# Patient Record
Sex: MALE | Race: WHITE | Employment: FULL TIME | URBAN - METROPOLITAN AREA
[De-identification: names, ages, dates, MRNs, and addresses within clinical notes are randomized per-mention and may not be internally consistent; named-entity substitution may affect disease eponyms.]

---

## 2017-10-18 ENCOUNTER — HOSPITAL ENCOUNTER (OUTPATIENT)
Dept: PHYSICAL THERAPY | Age: 32
Discharge: HOME | End: 2017-10-18
Payer: COMMERCIAL

## 2017-10-18 PROCEDURE — 97161 PT EVAL LOW COMPLEX 20 MIN: CPT | Performed by: PHYSICAL THERAPIST

## 2017-10-18 PROCEDURE — 97110 THERAPEUTIC EXERCISES: CPT | Performed by: PHYSICAL THERAPIST

## 2017-10-18 NOTE — PROGRESS NOTES
[facility]  [address]  [place], [address]  [phone] fax    Plan of Care/ Statement of Necessity for Physical Therapy Services    Patient name: Leodan Krishnamurthy Start of Care: 10/18/2017   Referral source: Self Referred : 1985    Medical Diagnosis: Plantar fasciitis, bilateral [M72.2]   Onset Date:1+ years    Treatment Diagnosis: B plantar foot pain, plantar fasciosis   Prior Hospitalization: see medical history Provider#: [id number]   Medications: Verified on Patient summary List    Comorbidities: none   Prior Level of Function: bodyweight resistance exercises, cycling, occasional jog, independent ADLs     The Plan of Care and following information is based on the information from the initial evaluation. Assessment/ key information: Patient presents with persist/chronic B plantar fasciosis. Has never been through a course of conservative care or physical therapy before.       Patient will continue to benefit from skilled PT services to modify and progress therapeutic interventions, address functional mobility deficits, address ROM deficits, address strength deficits, analyze and address soft tissue restrictions, analyze and cue movement patterns, analyze and modify body mechanics/ergonomics and assess and modify postural abnormalities to attain remaining goals.   Evaluation Complexity History LOW Complexity : Zero comorbidities / personal factors that will impact the outcome / POC; Examination LOW Complexity : 1-2 Standardized tests and measures addressing body structure, function, activity limitation and / or participation in recreation  ;Presentation LOW Complexity : Stable, uncomplicated  ;Clinical Decision Making MEDIUM Complexity : FOTO score of 26-74  Overall Complexity Rating: LOW   Problem List: pain affecting function, decrease ADL/ functional abilitiies and decrease activity tolerance   Treatment Plan may include any combination of the following: Therapeutic exercise, Therapeutic activities, Neuromuscular re-education, Physical agent/modality, Gait/balance training, Manual therapy, Aquatic therapy, Patient education and Self Care training  Patient / Family readiness to learn indicated by: asking questions, trying to perform skills and interest  Persons(s) to be included in education: patient (P)  Barriers to Learning/Limitations: None  Patient Goal (s): Mavis Angeles freedom for exercise & more ability to walk longer distances on consecutive days  Patient Self Reported Health Status: good  Rehabilitation Potential: good    Short Term Goals: To be accomplished in 2 weeks:                         Patient will report compliance with HEP at least 1x/day to aid in rehabilitation program.                           Long Term Goals: To be accomplished in 4 weeks:                         Patient will increase strength to 5/5 throughout B LEs to aid in return recreational activities and ADLs. Patient will report pain less than 1-2/10 average to aid in completion of ADLs. Patient will ambulate 20 minutes on level surface and no AD pain free. Patient will improve FOTO to 80 points overall to demonstrate improvement in functional ability. Frequency / Duration: Patient to be seen 2 times per week for 4 weeks. Patient/ Caregiver education and instruction: Diagnosis, prognosis, self care, activity modification and exercises   [x]  Plan of care has been reviewed with ODETTE March PT, DPT 10/18/2017 5:12 PM    ________________________________________________________________________    I certify that the above Therapy Services are being furnished while the patient is under my care. I agree with the treatment plan and certify that this therapy is necessary.     [de-identified] Signature:____________________  Date:____________Time: _________    Please sign and return to [facility]  [address]  [place], [address]  [phone] fax

## 2017-10-18 NOTE — PROGRESS NOTES
PT DAILY TREATMENT NOTE/FOOT AND ANKLE EVAL 3-16    Patient Name: Chrystal Maya  Date:10/18/2017  : 1985  [x]  Patient  Verified  Payor: Randynoah Brodie / Plan: [employer] [facility] / Product Type: PPO /    In time:4:10 Out time:4:55  Total Treatment Time (min): 45  Total Timed Codes (min): 23  1:1 Treatment Time ( only): 45   Visit #: 1 of 8    Treatment Area: Plantar fasciitis, bilateral [M72.2]    SUBJECTIVE  Pain Level (0-10 scale): 1  []constant []intermittent []improving []worsening []no change since onset    Any medication changes, allergies to medications, adverse drug reactions, diagnosis change, or new procedure performed?: [x] No    [] Yes (see summary sheet for update)  Subjective functional status/changes:     Patient has CC of B foot pain  for 9 years. Hx of activity, was sedentary then restarted. ALEXANDER: denies. Patient describes pain as soreness, sharp, intermittent. Pain is worse in AM. Denies numbness/tingling. Denies popping/clicking. Aggravating factors:running, prolonged walking, prolonged standing. Alleviating factors: ibuprofen, stretching, rest.  Denies red flags: SOB, chest pain, dizziness/lightheadedness, blurred/double vision, HA, chills/fevers, night sweats, change in bowel/bladder control, abdominal pain, difficulty swallowing, slurred speech, unexplained weight gain/loss. PMHx: unremarkable. Surgical Hx: unremarkable. Social Hx: 2 level home, alcohol occasional, tobacco, work status: . PLOF: bodyweight resistance, cycling, occasional jog, independent ADLs. CLOF: same.        OBJECTIVE/EXAMINATION    22 min [x]Eval                  []Re-Eval       23 min Therapeutic Exercise:  [x] See flow sheet :   Rationale: increase ROM, increase strength and decrease pain to improve the patients ability to complete ADLs      With   [] TE   [] TA   [] neuro   [] other: Patient Education: [x] Review HEP    [] Progressed/Changed HEP based on:   [] positioning   [] body mechanics [] transfers   [] heat/ice application    [] other:        Physical Therapy Evaluation  - Foot and Ankle  Observation: No physical abnormalities noted in either foot/ankle    Gait: [x] Normal    [] Abnormal    [] Antalgic    [] NWB    Device:  Describe:    ROM/Strength  [] Unable to assess at this time      AROM          Strength (1-5)   Left Right Left  Right   Dorsiflexion 15 15 5 5   Plantarflexion 50 50 5 5   Inversion 45 45 5 5   Eversion 15 15 5 5   Great Toe Ext 70 70 5 5     Flexibility: [] Unable to assess at this time  Gastroc:    (L) Tightness [x] WNL   [] Min   [] Mod   [] Severe    (R) Tightness [x] WNL   [] Min   [] Mod   [] Severe  Soleus:    (L) Tightness [x] WNL   [] Min   [] Mod   [] Severe    (R) Tightness [x] WNL   [] Min   [] Mod   [] Severe    Palpation:   Location:B plantar fascia  Patient's Pain Response: [x] Min   [] Mod   [] Severe    Other tests/ comments: deferred       Pain Level (0-10 scale) post treatment: 1    ASSESSMENT/Changes in Function: Patient presents with persist/chronic B plantar fasciosis. Has never been through a course of conservative care or physical therapy before. Patient will continue to benefit from skilled PT services to modify and progress therapeutic interventions, address functional mobility deficits, address ROM deficits, address strength deficits, analyze and address soft tissue restrictions, analyze and cue movement patterns, analyze and modify body mechanics/ergonomics and assess and modify postural abnormalities to attain remaining goals.      [x]  See Plan of Care  []  See progress note/recertification  []  See Discharge Summary         Progress towards goals / Updated goals:  See POC    PLAN  []  Upgrade activities as tolerated     [x]  Continue plan of care  []  Update interventions per flow sheet       []  Discharge due to:_  []  Other:_      Candace Wilks, PT, DPT 10/18/2017  4:19 PM

## 2017-10-25 ENCOUNTER — HOSPITAL ENCOUNTER (OUTPATIENT)
Dept: PHYSICAL THERAPY | Age: 32
Discharge: HOME | End: 2017-10-25
Payer: COMMERCIAL

## 2017-10-25 PROCEDURE — 97110 THERAPEUTIC EXERCISES: CPT

## 2017-10-25 PROCEDURE — 97112 NEUROMUSCULAR REEDUCATION: CPT

## 2017-10-25 PROCEDURE — 97140 MANUAL THERAPY 1/> REGIONS: CPT

## 2017-10-25 NOTE — PROGRESS NOTES
PT DAILY TREATMENT NOTE     Patient Name: Bonilla Massing  Date:10/25/2017  : 1985  [x]  Patient  Verified  Payor: BLUE CROSS / Plan: [employer] [facility] [place] / Product Type: PPO /    In time:500  Out time:538  Total Treatment Time (min): 38  Visit #: 2 of 8    Treatment Area: Plantar fasciitis, bilateral [M72.2]    SUBJECTIVE  Pain Level (0-10 scale): 0  Any medication changes, allergies to medications, adverse drug reactions, diagnosis change, or new procedure performed?: [x] No    [] Yes (see summary sheet for update)  Subjective functional status/changes:   [] No changes reported  Reports pain in B plantar surface of pain with prolonged walking and running. OBJECTIVE    14 min Therapeutic Exercise:  [] See flow sheet :   Rationale: increase ROM and increase strength to improve the patients ability to tolerate prolonged walking    8 min Neuromuscular Re-education:  []  See flow sheet :   Rationale: increase ROM, increase strength, improve balance and increase proprioception  to improve the patients ability to perform high level activities    18 min Manual Therapy:  Per flow sheet/reassessment   Rationale: decrease pain, increase ROM, increase tissue extensibility and decrease trigger points to increase ease with prolonged walking/running          With   [] TE   [] TA   [] neuro   [] other: Patient Education: [x] Review HEP    [x] Progressed/Changed HEP based on:   [] positioning   [] body mechanics   [] transfers   [] heat/ice application    [] other:      Other Objective/Functional Measures:      Pain Level (0-10 scale) post treatment: 0    ASSESSMENT/Changes in Function: Patient presents with increased mm restrictions in B plantar fascia and TrPs noted along lateral gastroc B. Exhibits decreased L ankle DF compared to R and decreased eccentric glute strength L > R as noted with repetitive step downs.       Patient will continue to benefit from skilled PT services to modify and progress therapeutic interventions, address functional mobility deficits, address ROM deficits, address strength deficits, analyze and address soft tissue restrictions, analyze and cue movement patterns and assess and modify postural abnormalities to attain remaining goals. []  See Plan of Care  []  See progress note/recertification  []  See Discharge Summary         Progress towards goals / Updated goals:  Short Term Goals: To be accomplished in 2 weeks:                         FKFUYXD will report compliance with HEP at least 1x/day to aid in rehabilitation program. Goal met 10/25/17       Long Term Goals: To be accomplished in 4 weeks:                         WTCESKZ will increase strength to 5/5 throughout B LEs to aid in return recreational activities and ADLs.                        SPJVIEV will report pain less than 1-2/10 average to aid in completion of ADLs.                          DNBOJDT will ambulate 20 minutes on level surface and no AD pain free.                         Patient will improve FOTO to 80 points overall to demonstrate improvement in functional ability.        PLAN  []  Upgrade activities as tolerated     []  Continue plan of care  [x]  Update interventions per flow sheet       []  Discharge due to:_  []  Other:_      Shanna Mc, PT 10/25/2017  6:14 PM    Future Appointments  Date Time Provider Mac Gonzalez   10/30/2017 4:00 PM Lianne Mendez PTA [facility]   11/1/2017 4:00 PM Lianne Mendez PTA [facility]

## 2017-10-30 ENCOUNTER — HOSPITAL ENCOUNTER (OUTPATIENT)
Dept: PHYSICAL THERAPY | Age: 32
Discharge: HOME | End: 2017-10-30
Payer: COMMERCIAL

## 2017-10-30 PROCEDURE — 97140 MANUAL THERAPY 1/> REGIONS: CPT

## 2017-10-30 PROCEDURE — 97112 NEUROMUSCULAR REEDUCATION: CPT

## 2017-10-30 NOTE — PROGRESS NOTES
PT DAILY TREATMENT NOTE     Patient Name: Heather Wells  Date:10/30/2017  : 1985  [x]  Patient  Verified  Payor: BLUE CROSS / Plan:  [facility] / Product Type: PPO /    In time:4:01  Out time:4:38  Total Treatment Time (min): 37  Visit #: 3 of 8    Treatment Area: Plantar fasciitis, bilateral [M72.2]    SUBJECTIVE  Pain Level (0-10 scale): 0/10  Any medication changes, allergies to medications, adverse drug reactions, diagnosis change, or new procedure performed?: [x] No    [] Yes (see summary sheet for update)  Subjective functional status/changes:   [] No changes reported  \"Mel found a knot in my calf that I didn't know about. \"    OBJECTIVE     27 min Neuromuscular Re-education:  [x]  See flow sheet :   Rationale: increase ROM, increase strength and increase proprioception  to improve the patients ability to perform ADL's. 10 min Manual Therapy:  Graston technique to (B) gastroc, soleus, achilles tendon and plantar fascia. Rationale: decrease pain, increase ROM, increase tissue extensibility and decrease trigger points to improve activity tolerance. With   [x] TE   [] TA   [] neuro   [] other: Patient Education: [x] Review HEP    [] Progressed/Changed HEP based on:   [] positioning   [] body mechanics   [] transfers   [] heat/ice application    [] other:      Other Objective/Functional Measures: Trial Graston technique. Pain Level (0-10 scale) post treatment: 0/10    ASSESSMENT/Changes in Function: Cueing to correct eccentric step downs. Pt reported no pain throughout treatment. Patient will continue to benefit from skilled PT services to modify and progress therapeutic interventions, address functional mobility deficits, address ROM deficits, address strength deficits, analyze and address soft tissue restrictions and analyze and modify body mechanics/ergonomics to attain remaining goals.      [x]  See Plan of Care  []  See progress note/recertification  []  See Discharge Summary         Progress towards goals / Updated goals:  Short Term Goals: To be accomplished in 2 weeks:                         JIDRFLASHU will report compliance with HEP at least 1x/day to aid in rehabilitation program. Goal met 10/25/17   Long Term Goals: To be accomplished in 4 weeks:                         WUOHQAV will increase strength to 5/5 throughout B LEs to aid in return recreational activities and ADLs.                        XPEZTCS will report pain less than 1-2/10 average to aid in completion of ADLs.                        HZDCTDD will ambulate 20 minutes on level surface and no AD pain free.                         Patient will improve FOTO to 80 points overall to demonstrate improvement in functional ability.      PLAN  []  Upgrade activities as tolerated     [x]  Continue plan of care  []  Update interventions per flow sheet       []  Discharge due to:_  []  Other:_      Arik Holloway PTA 10/30/2017  4:43 PM    Future Appointments  Date Time Provider Mac Gonzalez   11/1/2017 4:00 PM Arik Holloway PTA [facility]

## 2017-11-01 ENCOUNTER — HOSPITAL ENCOUNTER (OUTPATIENT)
Dept: PHYSICAL THERAPY | Age: 32
Discharge: HOME | End: 2017-11-01
Payer: COMMERCIAL

## 2017-11-01 PROCEDURE — 97112 NEUROMUSCULAR REEDUCATION: CPT

## 2017-11-01 PROCEDURE — 97140 MANUAL THERAPY 1/> REGIONS: CPT

## 2017-11-01 NOTE — PROGRESS NOTES
PT DAILY TREATMENT NOTE     Patient Name: Sadaf Davila  Date:2017  : 1985  [x]  Patient  Verified  Payor: BLUE CROSS / Plan: [employer] [facility] / Product Type: PPO /    In time:4:00  Out time:4:52  Total Treatment Time (min): 52  Visit #: 4 of 8    Treatment Area: Plantar fasciitis, bilateral [M72.2]    SUBJECTIVE  Pain Level (0-10 scale): 0/10  Any medication changes, allergies to medications, adverse drug reactions, diagnosis change, or new procedure performed?: [x] No    [] Yes (see summary sheet for update)  Subjective functional status/changes:   [] No changes reported  \"I was standing for 4 hours yesterday. Started to hurt after 1-2 hours. Can't remember if it got worse. \"    OBJECTIVE    42 min Neuromuscular Re-education:  [x]  See flow sheet :   Rationale: increase ROM, increase strength and increase proprioception  to improve the patients ability to perform functional activities. 10 min Manual Therapy:  Graston technique to (B) gastroc, soleus, achilles tendon and plantar fascia. Rationale: decrease pain, increase ROM, increase tissue extensibility and decrease trigger points to improve activity tolerance. With   [x] TE   [] TA   [] neuro   [] other: Patient Education: [x] Review HEP    [] Progressed/Changed HEP based on:   [] positioning   [] body mechanics   [] transfers   [] heat/ice application    [] other:      Other Objective/Functional Measures: Added pilates trapeze tower series with 2 yellow springs. Pain Level (0-10 scale) post treatment: (L) 1/10, (R) 0/10    ASSESSMENT/Changes in Function: Performing exercises well, pt is able to correct form/mechanics after VC's. CC of tightness in (B) LE's during treatment.     Patient will continue to benefit from skilled PT services to modify and progress therapeutic interventions, address functional mobility deficits, address ROM deficits, address strength deficits, analyze and address soft tissue restrictions and analyze and modify body mechanics/ergonomics to attain remaining goals. [x]  See Plan of Care  []  See progress note/recertification  []  See Discharge Summary         Progress towards goals / Updated goals:  Short Term Goals: To be accomplished in 2 weeks:                         PBZNYENRIQUE will report compliance with HEP at least 1x/day to aid in rehabilitation program. Goal met 10/25/17   Long Term Goals: To be accomplished in 4 weeks:                         XEMXDVA will increase strength to 5/5 throughout B LEs to aid in return recreational activities and ADLs.                        OTQXFQD will report pain less than 1-2/10 average to aid in completion of ADLs.                        IRDCZDS will ambulate 20 minutes on level surface and no AD pain free.                         Patient will improve FOTO to 80 points overall to demonstrate improvement in functional ability. PLAN  []  Upgrade activities as tolerated     [x]  Continue plan of care  []  Update interventions per flow sheet       []  Discharge due to:_  []  Other:_      Markus Warner, PTA 11/1/2017  4:08 PM    No future appointments.

## 2017-11-07 ENCOUNTER — HOSPITAL ENCOUNTER (OUTPATIENT)
Dept: PHYSICAL THERAPY | Age: 32
Discharge: HOME | End: 2017-11-07
Payer: COMMERCIAL

## 2017-11-07 PROCEDURE — 97110 THERAPEUTIC EXERCISES: CPT

## 2017-11-07 PROCEDURE — 97112 NEUROMUSCULAR REEDUCATION: CPT

## 2017-11-07 PROCEDURE — 97140 MANUAL THERAPY 1/> REGIONS: CPT

## 2017-11-07 NOTE — PROGRESS NOTES
PT DAILY TREATMENT NOTE     Patient Name: Dinh Denton  Date:2017  : 1985  [x]  Patient  Verified  Payor: BLUE CROSS / Plan: [facility] [facility] [place] / Product Type: PPO /    In time:6:02  Out time:6:55  Total Treatment Time (min): 48  Visit #: 5 of 8    Treatment Area: Plantar fasciitis, bilateral [M72.2]    SUBJECTIVE  Pain Level (0-10 scale): 1-2/10  Any medication changes, allergies to medications, adverse drug reactions, diagnosis change, or new procedure performed?: [x] No    [] Yes (see summary sheet for update)  Subjective functional status/changes:   [] No changes reported  \"I walked a lot on a ship today so my feet are hurting. \"    OBJECTIVE    35 min Therapeutic Exercise:  [x] See flow sheet :   Rationale: increase ROM and increase strength to improve the patients ability to perform ADL's.    8 min Neuromuscular Re-education:  [x]  See flow sheet :   Rationale: increase strength and increase proprioception  to improve the patients ability to perform functional activities. 10 min Manual Therapy:  Graston technique to (B) gastroc, soleus, achilles tendon and plantar fascia. Rationale: decrease pain, increase ROM, increase tissue extensibility and decrease trigger points to improve activity tolerance. With   [x] TE   [] TA   [] neuro   [] other: Patient Education: [x] Review HEP    [] Progressed/Changed HEP based on:   [] positioning   [] body mechanics   [] transfers   [] heat/ice application    [] other:      Other Objective/Functional Measures:      Pain Level (0-10 scale) post treatment: 0-1/10    ASSESSMENT/Changes in Function: FOTO scored decreased to 69%. No significant changes reported in symptoms. Pt reports pain increases more when walking/standing in boots. Instructed pt to increase activity on weekends when not wearing boots to assess pt's response.     Patient will continue to benefit from skilled PT services to modify and progress therapeutic interventions, address functional mobility deficits, address ROM deficits, address strength deficits, analyze and address soft tissue restrictions and analyze and modify body mechanics/ergonomics to attain remaining goals. [x]  See Plan of Care  []  See progress note/recertification  []  See Discharge Summary         Progress towards goals / Updated goals:  Short Term Goals: To be accomplished in 2 weeks:                         PJWJCCE will report compliance with HEP at least 1x/day to aid in rehabilitation program. Goal met 10/25/17   Long Term Goals: To be accomplished in 4 weeks:                         QFZBMLO will increase strength to 5/5 throughout B LEs to aid in return recreational activities and ADLs.                        KQKNSIR will report pain less than 1-2/10 average to aid in completion of ADLs.                        PMCBYLP will ambulate 20 minutes on level surface and no AD pain free.                         Patient will improve FOTO to 80 points overall to demonstrate improvement in functional ability.  - FOTO decreaed to 69%. 11/7/2017    PLAN  []  Upgrade activities as tolerated     [x]  Continue plan of care  []  Update interventions per flow sheet       []  Discharge due to:_  []  Other:_      Darya Jameson, ODETTE 11/7/2017  6:34 PM    No future appointments.

## 2017-11-10 ENCOUNTER — APPOINTMENT (OUTPATIENT)
Dept: PHYSICAL THERAPY | Age: 32
End: 2017-11-10
Payer: COMMERCIAL

## 2017-11-24 NOTE — PROGRESS NOTES
[facility]  [address] [facility] [address]  [place], [address]  [phone] fax    Physical Therapy Discharge Summary  Patient name: Armand Quevedo Start of Care: 10/18/17   Referral source: Referred, Self, MD : 1985   Medical/Treatment Diagnosis: Plantar fasciitis, bilateral [M72.2] Onset Date:1+ years     Prior Hospitalization: see medical history Provider#: [id number]   Medications: Verified on Patient Summary List    Comorbidities: none   Prior Level of Function: bodyweight resistance exercises, cycling, occasional jog, independent ADLs    Visits from Start of Care: 5    Missed Visits: 0  Reporting Period : 10/18/17 to 17      Summary of Care:  Short Term Goals: To be accomplished in 2 weeks:                         HLLQGDB will report compliance with HEP at least 1x/day to aid in rehabilitation program. Goal met 10/25/17   Long Term Goals: To be accomplished in 4 weeks:                         Patient will increase strength to 5/5 throughout B LEs to aid in return recreational activities and ADLs.                        UNDLUFA will report pain less than 1-2/10 average to aid in completion of ADLs.                        ASIDWLM will ambulate 20 minutes on level surface and no AD pain free.                         Patient will improve FOTO to 80 points overall to demonstrate improvement in functional ability.  - FOTO decreaed to 69%. 2017   Patient has not returned to PT since 17; unplanned D/C.      ASSESSMENT/RECOMMENDATIONS:  [x]Discontinue therapy: []Patient has reached or is progressing toward set goals      [x]Patient is non-compliant or has abdicated      []Due to lack of appreciable progress towards set Ul. Kathya Varela, PT 2017 11:12 AM

## 2018-04-03 ENCOUNTER — HOSPITAL ENCOUNTER (OUTPATIENT)
Dept: ULTRASOUND IMAGING | Age: 33
Discharge: HOME | End: 2018-04-03
Attending: FAMILY MEDICINE
Payer: COMMERCIAL

## 2018-04-03 DIAGNOSIS — R10.11 RUQ ABDOMINAL PAIN: ICD-10-CM

## 2018-04-03 DIAGNOSIS — R10.9 ABDOMINAL PAIN: ICD-10-CM

## 2018-04-03 PROCEDURE — 76705 ECHO EXAM OF ABDOMEN: CPT

## 2020-05-06 ENCOUNTER — HOSPITAL ENCOUNTER (OUTPATIENT)
Dept: CT IMAGING | Age: 35
Discharge: HOME | End: 2020-05-06
Attending: OTOLARYNGOLOGY
Payer: OTHER GOVERNMENT

## 2020-05-06 DIAGNOSIS — J30.89 NON-SEASONAL ALLERGIC RHINITIS: ICD-10-CM

## 2020-05-06 DIAGNOSIS — J32.0 CHRONIC MAXILLARY SINUSITIS: ICD-10-CM

## 2020-05-06 DIAGNOSIS — J30.1 ALLERGIC RHINITIS DUE TO POLLEN: ICD-10-CM

## 2020-05-06 PROCEDURE — 70486 CT MAXILLOFACIAL W/O DYE: CPT

## 2020-12-07 ENCOUNTER — TRANSCRIBE ORDER (OUTPATIENT)
Dept: SCHEDULING | Age: 35
End: 2020-12-07

## 2020-12-07 DIAGNOSIS — R11.0 NAUSEA: Primary | ICD-10-CM

## 2020-12-07 DIAGNOSIS — R10.11 ABDOMINAL PAIN, RIGHT UPPER QUADRANT: ICD-10-CM

## 2020-12-28 ENCOUNTER — HOSPITAL ENCOUNTER (OUTPATIENT)
Dept: CT IMAGING | Age: 35
Discharge: HOME | End: 2020-12-28
Attending: FAMILY MEDICINE
Payer: OTHER GOVERNMENT

## 2020-12-28 DIAGNOSIS — R11.0 NAUSEA: ICD-10-CM

## 2020-12-28 DIAGNOSIS — R10.11 ABDOMINAL PAIN, RIGHT UPPER QUADRANT: ICD-10-CM

## 2020-12-28 PROCEDURE — 74178 CT ABD&PLV WO CNTR FLWD CNTR: CPT

## 2020-12-28 PROCEDURE — 74011000636 HC RX REV CODE- 636: Performed by: FAMILY MEDICINE

## 2020-12-28 RX ADMIN — IOPAMIDOL 100 ML: 612 INJECTION, SOLUTION INTRAVENOUS at 13:25

## 2022-08-30 ENCOUNTER — HOSPITAL ENCOUNTER (OUTPATIENT)
Dept: PHYSICAL THERAPY | Age: 37
Discharge: HOME | End: 2022-08-30
Payer: COMMERCIAL

## 2022-08-30 PROCEDURE — 97110 THERAPEUTIC EXERCISES: CPT

## 2022-08-30 PROCEDURE — 97161 PT EVAL LOW COMPLEX 20 MIN: CPT

## 2022-08-30 PROCEDURE — 97535 SELF CARE MNGMENT TRAINING: CPT

## 2022-08-30 NOTE — PROGRESS NOTES
PT DAILY TREATMENT NOTE/SHOULDER EVAL     Patient Name: Parris Nunez  Date:2022  : 1985  [x]  Patient  Verified  Payor: Arlette Linton / Plan: Abdi Arechiga / Product Type: Commerical /    In time:304  Out time:348  Total Treatment Time (min): 44  Visit #: 1 of 8         Treatment Area: Pain in left shoulder [M25.512]    SUBJECTIVE  Pain Level (0-10 scale): 0  []constant [x]intermittent [x]Improving overall  []worsening []no change over the past 3-4 months  Worse doing anything, using the arm   better doing nothing, rest  Any medication changes, allergies to medications, adverse drug reactions, diagnosis change, or new procedure performed?: [x] No    [] Yes (see summary sheet for update)  Subjective functional status/changes:     PLOF: I all areas of ADLS and activities, working, caring for child, household and community activities, cycling, paddling, motorcycling were all tolerated   Limitations to PLOF: pain   Mechanism of Injury: 23 while playing with daughter on a playground equipment set. Was leaping away and holding with the left arm in an abducted and extended position and he felt something pop in his left shoulder and causing pain for a good month before it started easing off  Current symptoms/Complaints: 31 YO male diagnosed as above and with  S/S consistent with above diagnosis presents to skilled outpatient PT [facility] residual left shoulder pain and weakness S/P injury 21 while playing with daughter on play ground set. He is right hand dominant. Pain today is 0. When it hurts notes pain at [facility] joint , lateral arm at deltoid insertion , or anteriorly at the deltopectoral groove.    Previous Treatment/Compliance: MD, ice, heat, medication  PMHx/Surgical Hx: asthma  Work Hx: full time  at [employer]: lives in a house, 2 story , not alone  Pt Goals: increase activity threshold before the pain  Barriers: [x]pain []financial []time []transportation []other  Motivation: good  Substance use: [x]Alcohol []Tobacco []other:   FABQ Score: []low []elevate  Cognition: A & O x 3    Other:    OBJECTIVE/EXAMINATION  Domestic Life: work, household and community activities, cycling, paddling, motorcycling and   Activity/Recreational Limitations: pain   Mobility: I   Self Care: I         21 min [x]Eval                  []Re-Eval       15 min Therapeutic Exercise:  [x] See flow sheet :   Rationale: increase ROM, increase strength, and improve coordination to improve the patients ability to tolerate ADLS and activities    8 min Self care  []  See flow sheet :   Rationale:  FOTO, POC, HEP , Anatomy, Goals   to improve the patients ability to tolerate ADLS and activities              With   [] TE   [] TA   [] neuro   [] other: Patient Education: [x] Review HEP    [] Progressed/Changed HEP based on:   [] positioning   [] body mechanics   [] transfers   [] heat/ice application    [] other:      Other Objective/Functional Measures:      Physical Therapy Evaluation - Shoulder    Posture: [] Poor    [x] Fair    [] Good    Describe:    ROM:  [] Unable to assess at this time                                           AROM                                                              PROM   Left Right  Left Right   Flexion 140 140 Flexion       Extension   Extension     Scaption/ 142 Scaptin/ABD     ER @ 0 Degrees   ER @ 0 Degrees     ER @ 90 Degrees   ER @ 90 Degrees     IR @ 90 Degrees   IR @  0 Degrees     HBH  right able  left able     HBB  right to left infrascapular angle, left to lower Tsp region    End Feel / Painful Arc:    Strength:   [] Unable to assess at this time                                                                            L (1-5) R (1-5) Pain   Flexors 4+ 5 [] Yes   [] No   Abductors 4+ 5 [] Yes   [] No   External Rotators 4 5 [] Yes   [] No   Internal Rotators 4+ 5 [] Yes   [] No   Supraspinatus   [] Yes   [] No   Serratus Anterior   [] Yes   [] No   Lower Trapezius   [] Yes   [] No   Elbow Flexion   [] Yes   [] No   Elbow Extension   [] Yes   [] No       Scapulohumoral Control / Rhythm:  Able to eccentrically lower with good control? Left: [x] Yes   [] No     Right: [x] Yes   [] No    Accessory Motions:    Palpation  [] Min  [x] Mod  [] Severe    Location:PTTP left anterior shoulder at the region of the sub acromial bursa  [] Min  [] Mod  [] Severe    Location:STC left UT and medial ST musculature  [] Min  [] Mod  [] Severe    Location:         Adson's Test  [] Pos   [] Neg Yergason's Test [] Pos   [] Neg  Kinsey's Test  [] Pos   [] Neg Cary's Sign [] Pos   [] Neg  Neer's Test  [] Pos   [x] Neg Clunk Test  [] Pos   [] Neg  Hawkin's Test  [] Pos   [x] Neg AC Joint  [] Pos   [] Neg  Speed's Test  [] Pos   [] Neg SC Joint  [] Pos   [] Neg  Empty Can  [] Pos   [x] Neg Pectoral Tightness [] Pos   [] Neg  Anterior Apprehension [] Pos   [] Neg   Posterior Apprehension [] Pos   [] Neg      Other Tests / Comments:        Pain Level (0-10 scale) post treatment: 0    ASSESSMENT/Changes in Function: Patient demonstrates the potential to make gains with improved ROM, strength, endurance/activity tolerance, functional FOTO survey score  and all within a reasonable time frame so as to increase their functional independence with ADLs and activities for carryover to  improved quality of life, tolerance to household and community activities, hobbies and recreation,  ability to hold child with left UE with improved ease, and carryover to any work demands. . Patient requires skilled Physical Therapy so as to monitor their response to and modify their treatment plan accordingly. Patient appears to be an appropriate candidate for skilled outpatient Physical Therapy.       Patient will continue to benefit from skilled PT services to modify and progress therapeutic interventions, address ROM deficits, address strength deficits, analyze and address soft tissue restrictions, analyze and cue movement patterns, analyze and modify body mechanics/ergonomics, assess and modify postural abnormalities, and instruct in home and community integration to attain remaining goals.      [x]  See Plan of Care  []  See progress note/recertification  []  See Discharge Summary         Progress towards goals / Updated goals:       PLAN  [x]  Upgrade activities as tolerated     [x]  Continue plan of care  []  Update interventions per flow sheet       []  Discharge due to:_  []  Other:_      Yury Ko, PT 8/30/2022  3:09 PM

## 2022-09-16 PROBLEM — R10.9 ABDOMINAL PAIN: Status: ACTIVE | Noted: 2022-09-16

## 2022-09-16 PROBLEM — D22.9 NEVUS: Status: ACTIVE | Noted: 2022-09-16

## 2022-09-16 PROBLEM — M67.40 GANGLION: Status: ACTIVE | Noted: 2022-09-16

## 2022-09-16 PROBLEM — J30.9 ALLERGIC RHINITIS: Status: ACTIVE | Noted: 2022-09-16

## 2022-09-16 PROBLEM — S49.90XA SHOULDER INJURY: Status: ACTIVE | Noted: 2022-09-16

## 2022-09-16 PROBLEM — R07.89 ATYPICAL CHEST PAIN: Status: ACTIVE | Noted: 2022-09-16

## 2022-09-16 PROBLEM — R11.0 NAUSEA: Status: ACTIVE | Noted: 2022-09-16

## 2022-09-16 PROBLEM — K64.4 EXTERNAL HEMORRHOID, BLEEDING: Status: ACTIVE | Noted: 2022-09-16

## 2022-09-16 PROBLEM — R10.11 RUQ ABDOMINAL PAIN: Status: ACTIVE | Noted: 2022-09-16

## 2022-09-16 PROBLEM — J02.9 SORE THROAT: Status: ACTIVE | Noted: 2022-09-16

## 2022-09-16 PROBLEM — M60.9: Status: ACTIVE | Noted: 2022-09-16

## 2022-09-16 PROBLEM — R05.1 ACUTE COUGH: Status: ACTIVE | Noted: 2022-09-16

## 2022-09-16 PROBLEM — M25.512 LEFT SHOULDER PAIN: Status: ACTIVE | Noted: 2022-09-16

## 2022-10-05 NOTE — PROGRESS NOTES
[facility] [address], [address] [place] [address], [address] [place] [address],[address] [phone] fax    Discharge Summary    Patient name: Akua Rahman     Start of Care: 22  Referral source: Mary Allred MD    : 1985  Medical/Treatment Diagnosis: Pain in left shoulder [M25.512]  Payor: Cathi Gonzáles / Plan: [id number] / Product Type: HMO /        Onset Date:21  Prior Hospitalization: see medical history   Provider#: [id number]   Comorbidities: asthma , + ETOH   Prior Level of Function:  I all areas of ADLS and activities, working, caring for child, household and community activities, cycling, paddling, motorcycling were all tolerated   Medications: Verified on Patient Summary List    Visits from [facility]: 1    Missed Visits: 0  Reporting Period : 22 to 22      Summary of Care:patient seen for eval only and did not return. He was issued HEP at [facility]. He should follow up with his MD as needed. Thank you. Short Term Goals: To be accomplished in 4 treatments:               1 patient will have established and be I with HEP to aid with I and self management at discharge               EVAL issued HEP               2 patient will report no difficulty with reaching to overhead shelf for carryover to home setting                EVAL little difficulty     Long Term Goals:  To be accomplished in 8 treatments:               1 patient will have FOTO 77 to show significant and improved gains to projected levels for carryover to home and work demands               EVAL 62               2 patient will have MMT left shoulder F/abd/ ER/IR 5 to aid with increase tolerance to heavy household chores               EVAL left shoulder F/abd/IR 4+   ER 4 and  some difficulty               3 patient will report overall 50% improvement for carryover to increase tolerance to his paddling and  cycling activities               EVAL self limits due to pain               4 patient will tolerate left overhead reach 5# 10X left UE with no significant increase pain for carryover to home setting               EVAL overhead reach 8-10# some difficulty    ASSESSMENT/RECOMMENDATIONS:  []Discontinue therapy progressing towards or have reached established goals  []Discontinue therapy due to lack of appreciable progress towards goals  [x]Discontinue therapy due to lack of attendance or compliance  []Other:     Thank you for this referral.     Rob Aggarwal, PT 10/5/2022 7:52 AM

## 2022-10-10 ENCOUNTER — OFFICE VISIT (OUTPATIENT)
Dept: SURGERY | Age: 37
End: 2022-10-10
Payer: COMMERCIAL

## 2022-10-10 VITALS
HEIGHT: 67 IN | WEIGHT: 143 LBS | TEMPERATURE: 97.9 F | RESPIRATION RATE: 18 BRPM | OXYGEN SATURATION: 98 % | HEART RATE: 67 BPM | SYSTOLIC BLOOD PRESSURE: 110 MMHG | DIASTOLIC BLOOD PRESSURE: 73 MMHG | BODY MASS INDEX: 22.44 KG/M2

## 2022-10-10 DIAGNOSIS — L30.9 DERMATITIS OF PERIANAL REGION: Primary | ICD-10-CM

## 2022-10-10 DIAGNOSIS — K64.0 GRADE I HEMORRHOIDS: ICD-10-CM

## 2022-10-10 PROCEDURE — 99204 OFFICE O/P NEW MOD 45 MIN: CPT | Performed by: COLON & RECTAL SURGERY

## 2022-10-10 PROCEDURE — 46600 DIAGNOSTIC ANOSCOPY SPX: CPT | Performed by: COLON & RECTAL SURGERY

## 2022-10-10 RX ORDER — TRIAMCINOLONE ACETONIDE 1 MG/G
OINTMENT TOPICAL 3 TIMES DAILY
Qty: 30 G | Refills: 0 | Status: SHIPPED | OUTPATIENT
Start: 2022-10-10

## 2022-10-10 NOTE — LETTER
10/10/2022    Patient: Helga Andres   YOB: 1985   Date of Visit: 10/10/2022     Jair Travis MD  [address] [facility] [address] [facility]  Via Fax: [address], [address] [facility] [address]  [facility] [address]  Via In [facility] [address]    Dear James Higgins,    I saw Gamaliel Lopez in the office today for anorectal discomfort. On exam he has signs of perianal dermatitis. I have prescribed triamcinolone cream and he will follow-up in the office in 3 weeks time. He also had some blood per rectum. After we resolve the perianal symptoms we will bring him to the endoscopy suite for a colonoscopy. If you have questions, please do not hesitate to call me. I look forward to following your patient along with you.       Sincerely,    Suze Kelly MD

## 2022-10-10 NOTE — PROGRESS NOTES
HPI: Alba Young is a 30 y.o. male presenting with chief complain of anorectal discomfort. This is been ongoing for 10 years. He has a pain, pain, difficulty cleaning the area. He sees some blood on the toilet paper when wiping. He moves his bowels daily. He denies constipation or diarrhea. He denies fecal incontinence. He has no family history of colon cancer. He has never had a colonoscopy. Past Medical History:   Diagnosis Date    Asthma     RAD (reactive airway disease)        History reviewed. No pertinent surgical history. Family History   Problem Relation Age of Onset    Hypertension Mother     Asthma Mother     Diabetes Mother     Colon Polyps Father     Heart Disease Neg Hx        Social History     Socioeconomic History    Marital status:    Tobacco Use    Smoking status: Former     Types: Cigarettes, Cigars    Smokeless tobacco: Never   Vaping Use    Vaping Use: Never used   Substance and Sexual Activity    Alcohol use: Yes     Alcohol/week: 14.0 standard drinks     Types: 14 Cans of beer per week    Drug use: Never       Review of Systems - Review of Systems   Constitutional: Negative. HENT:  Positive for congestion and sore throat. Negative for ear discharge, ear pain, hearing loss, nosebleeds, sinus pain and tinnitus. Allergies   Respiratory:  Positive for shortness of breath and wheezing. Negative for cough, hemoptysis, sputum production and stridor. Cardiovascular: Negative. Gastrointestinal: Negative. Genitourinary: Negative. Musculoskeletal:  Positive for joint pain. Negative for back pain, falls, myalgias and neck pain. Skin:  Positive for itching. Negative for rash. rectal   Neurological: Negative. Endo/Heme/Allergies: Negative. Psychiatric/Behavioral: Negative.          Outpatient Medications Marked as Taking for the 10/10/22 encounter (Office Visit) with Randy Dai MD   Medication Sig Dispense Refill    fluticasone propionate (FLONASE) 50 mcg/actuation nasal spray 2 Sprays by Both Nostrils route daily. No Known Allergies    Vitals:    10/10/22 1317   Resp: 18   Weight: 64.9 kg (143 lb)   Height: 5' 7\" (1.702 m)   PainSc:   1   PainLoc: Rectum       Physical Exam  Constitutional:       Appearance: He is well-developed. HENT:      Head: Normocephalic and atraumatic. Eyes:      Conjunctiva/sclera: Conjunctivae normal.   Abdominal:      General: There is no distension. Palpations: Abdomen is soft. There is no mass. Tenderness: There is no abdominal tenderness. There is no guarding. Musculoskeletal:         General: Normal range of motion. Lymphadenopathy:      Cervical: No cervical adenopathy. Skin:     General: Skin is warm and dry. Neurological:      Sensory: No sensory deficit. Psychiatric:         Speech: Speech normal.   Rectum: Small external hemorrhoids, lichenification of the perianal skin with a small amount of skin fissuring  Digital rectal exam: Moderate tone, no mass  Anoscopy: Mild internal hemorrhoids    Assessment / Plan    Perianal dermatitis  Triamcinolone 3 times daily  Follow-up in 3 to 4 weeks    Colonoscopy for rectal bleeding after resolution of symptoms    The diagnoses and plan were discussed with the patient. All questions answered. Plan of care agreed to by all concerned.

## 2022-10-10 NOTE — PATIENT INSTRUCTIONS
Don't use wipes     Use bland soap such as Dove    Do not over clean area, 1-2 swipes then done    Use steroid ointment as prescribed     Follow up in office in 3-4 weeks

## 2022-10-16 PROBLEM — J02.9 SORE THROAT: Status: RESOLVED | Noted: 2022-09-16 | Resolved: 2022-10-16

## 2022-11-03 ENCOUNTER — OFFICE VISIT (OUTPATIENT)
Dept: SURGERY | Age: 37
End: 2022-11-03
Payer: COMMERCIAL

## 2022-11-03 VITALS
DIASTOLIC BLOOD PRESSURE: 81 MMHG | OXYGEN SATURATION: 98 % | HEIGHT: 67 IN | WEIGHT: 143 LBS | TEMPERATURE: 97.4 F | HEART RATE: 73 BPM | SYSTOLIC BLOOD PRESSURE: 121 MMHG | RESPIRATION RATE: 18 BRPM | BODY MASS INDEX: 22.44 KG/M2

## 2022-11-03 DIAGNOSIS — L30.9 DERMATITIS OF PERIANAL REGION: Primary | ICD-10-CM

## 2022-11-03 DIAGNOSIS — K62.5 RECTAL BLEEDING: ICD-10-CM

## 2022-11-03 PROCEDURE — 99213 OFFICE O/P EST LOW 20 MIN: CPT | Performed by: COLON & RECTAL SURGERY

## 2022-11-03 NOTE — PROGRESS NOTES
Subjective: Some improvement. No further bleeding. Have been using the steroid ointment. Past medical history and ROS were reviewed and unchanged. Rectum: Lichenification much improved but not fully resolved    Assessment / Plan    Perianal dermatitis  Continue the steroid ointment for another 2 weeks and change to bland topicals  Follow-up in the office if any discomfort persist    Rectal bleeding  Schedule colonoscopy to rule out other causes of bleeding    20 minutes was spent in patient care. The diagnoses and plan were discussed with patient. All questions answered. Plan of care agreed to by all concerned.

## 2022-12-07 RX ORDER — CEPHALEXIN 500 MG/1
TABLET ORAL
COMMUNITY
Start: 2022-10-14

## 2022-12-07 RX ORDER — TRAMADOL HYDROCHLORIDE 50 MG/1
TABLET ORAL
COMMUNITY
Start: 2022-10-14 | End: 2022-12-14

## 2022-12-14 NOTE — PERIOP NOTES
Amada Bueno PAT phone assessment completed on 12/14/2022. The following instructions were reviewed with Amada Bueno and he  verbalized understanding. Do NOT eat or drink anything, including candy, gum, or ice chips after midnight on 12/21/2022, unless you have specific instructions from your surgeon or anesthesia provider to do so. You may brush your teeth before coming to the hospital.  No smoking 24 hours prior to the day of surgery. No alcohol 24 hours prior to the day of surgery. No recreational drugs for one week prior to the day of surgery. Leave all valuables, including money/purse, at home. Remove all jewelry, nail polish, acrylic nails, and makeup (including mascara); no lotions powders, deodorant, or perfume/cologne/after shave on the skin. Glasses/contact lenses and dentures may be worn to the hospital.  They will be removed prior to surgery. Call your doctor if symptoms of a cold or illness develop within 24-48 hours prior to your surgery. 10.  AN ADULT MUST DRIVE YOU HOME AFTER OUTPATIENT SURGERY. 11.  If you are having an outpatient procedure, please make arrangements for a responsible adult to be with you for 24 hours after your surgery. Special Instructions:      Bring list of CURRENT medications. Bring any pertinent legal medical records. Take these medications the morning of surgery with a sip of water:  per surgeon  Follow physician instructions about stopping anticoagulants. Complete bowel prep per MD instructions.

## 2022-12-20 ENCOUNTER — ANESTHESIA EVENT (OUTPATIENT)
Dept: ENDOSCOPY | Age: 37
End: 2022-12-20
Payer: COMMERCIAL

## 2022-12-21 ENCOUNTER — ANESTHESIA (OUTPATIENT)
Dept: ENDOSCOPY | Age: 37
End: 2022-12-21
Payer: COMMERCIAL

## 2022-12-21 ENCOUNTER — HOSPITAL ENCOUNTER (OUTPATIENT)
Age: 37
Setting detail: OUTPATIENT SURGERY
Discharge: HOME | End: 2022-12-21
Attending: COLON & RECTAL SURGERY | Admitting: COLON & RECTAL SURGERY
Payer: COMMERCIAL

## 2022-12-21 VITALS
HEART RATE: 54 BPM | RESPIRATION RATE: 14 BRPM | SYSTOLIC BLOOD PRESSURE: 115 MMHG | DIASTOLIC BLOOD PRESSURE: 68 MMHG | OXYGEN SATURATION: 100 % | HEIGHT: 67 IN | TEMPERATURE: 97.9 F | WEIGHT: 139.5 LBS | BODY MASS INDEX: 21.9 KG/M2

## 2022-12-21 PROCEDURE — 74011250636 HC RX REV CODE- 250/636: Performed by: NURSE ANESTHETIST, CERTIFIED REGISTERED

## 2022-12-21 PROCEDURE — 45378 DIAGNOSTIC COLONOSCOPY: CPT | Performed by: COLON & RECTAL SURGERY

## 2022-12-21 PROCEDURE — 74011000250 HC RX REV CODE- 250: Performed by: NURSE ANESTHETIST, CERTIFIED REGISTERED

## 2022-12-21 PROCEDURE — 76040000019: Performed by: COLON & RECTAL SURGERY

## 2022-12-21 PROCEDURE — 2709999900 HC NON-CHARGEABLE SUPPLY: Performed by: COLON & RECTAL SURGERY

## 2022-12-21 PROCEDURE — C1729 CATH, DRAINAGE: HCPCS | Performed by: COLON & RECTAL SURGERY

## 2022-12-21 PROCEDURE — 77030008565 HC TBNG SUC IRR ERBE -B: Performed by: COLON & RECTAL SURGERY

## 2022-12-21 PROCEDURE — 76060000031 HC ANESTHESIA FIRST 0.5 HR: Performed by: COLON & RECTAL SURGERY

## 2022-12-21 PROCEDURE — 74011250637 HC RX REV CODE- 250/637

## 2022-12-21 RX ORDER — SODIUM CHLORIDE 0.9 % (FLUSH) 0.9 %
5-40 SYRINGE (ML) INJECTION AS NEEDED
Status: CANCELLED | OUTPATIENT
Start: 2022-12-21

## 2022-12-21 RX ORDER — ONDANSETRON 2 MG/ML
4 INJECTION INTRAMUSCULAR; INTRAVENOUS ONCE
Status: CANCELLED | OUTPATIENT
Start: 2022-12-21 | End: 2022-12-21

## 2022-12-21 RX ORDER — SODIUM CHLORIDE, SODIUM LACTATE, POTASSIUM CHLORIDE, CALCIUM CHLORIDE 600; 310; 30; 20 MG/100ML; MG/100ML; MG/100ML; MG/100ML
50 INJECTION, SOLUTION INTRAVENOUS CONTINUOUS
Status: DISCONTINUED | OUTPATIENT
Start: 2022-12-21 | End: 2022-12-21 | Stop reason: HOSPADM

## 2022-12-21 RX ORDER — PROPOFOL 10 MG/ML
INJECTION, EMULSION INTRAVENOUS AS NEEDED
Status: DISCONTINUED | OUTPATIENT
Start: 2022-12-21 | End: 2022-12-21 | Stop reason: HOSPADM

## 2022-12-21 RX ORDER — LIDOCAINE HYDROCHLORIDE 20 MG/ML
INJECTION, SOLUTION EPIDURAL; INFILTRATION; INTRACAUDAL; PERINEURAL AS NEEDED
Status: DISCONTINUED | OUTPATIENT
Start: 2022-12-21 | End: 2022-12-21 | Stop reason: HOSPADM

## 2022-12-21 RX ORDER — FAMOTIDINE 20 MG/1
TABLET, FILM COATED ORAL
Status: COMPLETED
Start: 2022-12-21 | End: 2022-12-21

## 2022-12-21 RX ORDER — LIDOCAINE HYDROCHLORIDE 10 MG/ML
0.1 INJECTION, SOLUTION EPIDURAL; INFILTRATION; INTRACAUDAL; PERINEURAL AS NEEDED
Status: DISCONTINUED | OUTPATIENT
Start: 2022-12-21 | End: 2022-12-21 | Stop reason: HOSPADM

## 2022-12-21 RX ORDER — SODIUM CHLORIDE 0.9 % (FLUSH) 0.9 %
5-40 SYRINGE (ML) INJECTION EVERY 8 HOURS
Status: CANCELLED | OUTPATIENT
Start: 2022-12-21

## 2022-12-21 RX ORDER — FAMOTIDINE 20 MG/1
20 TABLET, FILM COATED ORAL ONCE
Status: COMPLETED | OUTPATIENT
Start: 2022-12-21 | End: 2022-12-21

## 2022-12-21 RX ADMIN — PROPOFOL 50 MG: 10 INJECTION, EMULSION INTRAVENOUS at 15:05

## 2022-12-21 RX ADMIN — PROPOFOL 50 MG: 10 INJECTION, EMULSION INTRAVENOUS at 15:03

## 2022-12-21 RX ADMIN — LIDOCAINE HYDROCHLORIDE 40 MG: 20 INJECTION, SOLUTION EPIDURAL; INFILTRATION; INTRACAUDAL; PERINEURAL at 14:59

## 2022-12-21 RX ADMIN — FAMOTIDINE 20 MG: 20 TABLET, FILM COATED ORAL at 14:17

## 2022-12-21 RX ADMIN — SODIUM CHLORIDE, SODIUM LACTATE, POTASSIUM CHLORIDE, AND CALCIUM CHLORIDE 50 ML/HR: 600; 310; 30; 20 INJECTION, SOLUTION INTRAVENOUS at 14:18

## 2022-12-21 RX ADMIN — PROPOFOL 100 MG: 10 INJECTION, EMULSION INTRAVENOUS at 14:59

## 2022-12-21 RX ADMIN — PROPOFOL 50 MG: 10 INJECTION, EMULSION INTRAVENOUS at 15:08

## 2022-12-21 NOTE — DISCHARGE INSTRUCTIONS
Repeat colonoscopy in 10 year(s). Follow up in the office if anorectal symptoms do not resolve. DISCHARGE SUMMARY from Nurse    PATIENT INSTRUCTIONS:    After general anesthesia or intravenous sedation, for 24 hours or while taking prescription Narcotics:  Limit your activities  Do not drive and operate hazardous machinery  Do not make important personal or business decisions  Do  not drink alcoholic beverages  If you have not urinated within 8 hours after discharge, please contact your surgeon on call. Report the following to your surgeon:  Excessive pain, swelling, redness or odor of or around the surgical area  Temperature over 100.5  Nausea and vomiting lasting longer than 4 hours or if unable to take medications  Any signs of decreased circulation or nerve impairment to extremity: change in color, persistent  numbness, tingling, coldness or increase pain  Any questions    What to do at Home:  Recommended activity: Activity as tolerated and no driving for today    *  Please give a list of your current medications to your Primary Care Provider. *  Please update this list whenever your medications are discontinued, doses are      changed, or new medications (including over-the-counter products) are added. *  Please carry medication information at all times in case of emergency situations. These are general instructions for a healthy lifestyle:    No smoking/ No tobacco products/ Avoid exposure to second hand smoke  Surgeon General's Warning:  Quitting smoking now greatly reduces serious risk to your health.     Obesity, smoking, and sedentary lifestyle greatly increases your risk for illness    A healthy diet, regular physical exercise & weight monitoring are important for maintaining a healthy lifestyle    You may be retaining fluid if you have a history of heart failure or if you experience any of the following symptoms:  Weight gain of 3 pounds or more overnight or 5 pounds in a week, increased swelling in our hands or feet or shortness of breath while lying flat in bed. Please call your doctor as soon as you notice any of these symptoms; do not wait until your next office visit. The discharge information has been reviewed with the patient. The patient verbalized understanding. Discharge medications reviewed with the patient and appropriate educational materials and side effects teaching were provided.   ___________________________________________________________________________________________________________________________________

## 2022-12-21 NOTE — ANESTHESIA POSTPROCEDURE EVALUATION
Procedure(s):  COLONOSCOPY. MAC    Anesthesia Post Evaluation      Multimodal analgesia: multimodal analgesia used between 6 hours prior to anesthesia start to PACU discharge  Patient location during evaluation: bedside  Patient participation: complete - patient participated  Level of consciousness: awake  Pain score: 0  Pain management: adequate  Airway patency: patent  Anesthetic complications: no  Cardiovascular status: stable  Respiratory status: acceptable  Hydration status: acceptable  Post anesthesia nausea and vomiting:  controlled  Final Post Anesthesia Temperature Assessment:  Normothermia (36.0-37.5 degrees C)      INITIAL Post-op Vital signs:   Vitals Value Taken Time   /46 12/21/22 1532   Temp 36.6 °C (97.9 °F) 12/21/22 1523   Pulse 62 12/21/22 1534   Resp 13 12/21/22 1534   SpO2 100 % 12/21/22 1534   Vitals shown include unvalidated device data.

## 2022-12-21 NOTE — ANESTHESIA PREPROCEDURE EVALUATION
Relevant Problems   No relevant active problems       Anesthetic History   No history of anesthetic complications            Review of Systems / Medical History  Patient summary reviewed and pertinent labs reviewed    Pulmonary            Asthma        Neuro/Psych   Within defined limits           Cardiovascular  Within defined limits                     GI/Hepatic/Renal  Within defined limits              Endo/Other  Within defined limits           Other Findings              Physical Exam    Airway  Mallampati: II  TM Distance: > 6 cm  Neck ROM: normal range of motion   Mouth opening: Normal     Cardiovascular  Regular rate and rhythm,  S1 and S2 normal,  no murmur, click, rub, or gallop             Dental  No notable dental hx       Pulmonary  Breath sounds clear to auscultation               Abdominal  GI exam deferred       Other Findings            Anesthetic Plan    ASA: 2  Anesthesia type: MAC          Induction: Intravenous  Anesthetic plan and risks discussed with: Patient

## 2022-12-21 NOTE — H&P
HPI: Kellie Pereira is a 30 y.o. male presenting with chief complain of rectal bleeding    Past Medical History:   Diagnosis Date    Asthma     RAD (reactive airway disease)        Past Surgical History:   Procedure Laterality Date    HX UROLOGICAL  10/14/2022    VASECTOMY; Dr. Jonny Lopez       Family History   Problem Relation Age of Onset    Hypertension Mother     Asthma Mother     Diabetes Mother     Colon Polyps Father     Heart Disease Neg Hx        Social History     Socioeconomic History    Marital status:    Tobacco Use    Smoking status: Former     Types: Cigarettes, Cigars    Smokeless tobacco: Never   Vaping Use    Vaping Use: Never used   Substance and Sexual Activity    Alcohol use: Yes     Alcohol/week: 14.0 standard drinks     Types: 14 Cans of beer per week    Drug use: Never       Review of Systems - neg    Outpatient Medications Marked as Taking for the 12/21/22 encounter [facility] Encounter)   Medication Sig Dispense Refill    fluticasone propionate (FLONASE) 50 mcg/actuation nasal spray 2 Sprays by Both Nostrils route daily. No Known Allergies    Vitals:    12/14/22 1022 12/21/22 1358   BP:  114/75   Pulse:  63   Resp:  20   Temp:  98.2 °F (36.8 °C)   SpO2:  100%   Weight: 65.8 kg (145 lb) 63.3 kg (139 lb 8 oz)   Height: 5' 7\" (1.702 m)        Physical Exam  Constitutional:       Appearance: He is well-developed. HENT:      Head: Normocephalic and atraumatic. Eyes:      Conjunctiva/sclera: Conjunctivae normal.   Abdominal:      General: There is no distension. Palpations: Abdomen is soft. There is no mass. Tenderness: There is no abdominal tenderness. There is no guarding. Musculoskeletal:         General: Normal range of motion. Lymphadenopathy:      Cervical: No cervical adenopathy. Skin:     General: Skin is warm and dry. Neurological:      Sensory: No sensory deficit.    Psychiatric:         Speech: Speech normal.       Assessment / Plan    colonoscopy    The diagnoses and plan were discussed with the patient. All questions answered. Plan of care agreed to by all concerned.

## 2022-12-21 NOTE — PROCEDURES
[facility]  [address], [address] [facility],[address]   [place], [address]  [phone]                    Colonoscopy Procedure Note      Dolly Tyson  1985  [id number]                Date of Procedure: 12/21/2022    Preoperative diagnosis: Rectal Bleeding    Postoperative diagnosis: Normal exam    :  Marta Hernandez MD    Assistant(s): Endoscopy RN-1: Nathalie Winn RN; [facility]  Float Staff: Ana Mercado RN    Sedation: MAC    Complications: None    Implants: None    Procedure Details:  Prior to the procedure, a history and physical were performed. The patients medications, allergies and sensitivities were reviewed and all questions were answered. After informed consent was obtained for the procedure, with all risks and benefits of procedure explained. The patient was taken to the endoscopy suite and placed in the left lateral decubitus position. Patient identification and proposed procedure were verified prior to the procedure by the nurse and I. After sequential anesthesia administered by anesthesiologist, a digital rectal exam was performed and was normal.  The Olympus video colonoscope was introduced through the anus and advanced to cecum, which was identified by the ileocecal valve and appendiceal orifice. The quality of preparation was excellent. The colonoscope was slowly withdrawn and the mucosa examined for any abnormalities. Cecal withdrawal time was greater than 6 minutes. The patient tolerated the procedure well. There were no complications. Findings/Interventions:   Polyps - none    EBL: none    Recommendations: -Repeat colonoscopy in 10 years   Resume normal medication(s).      Discharge Disposition:  Yan Bueno MD  12/21/2022  3:23 PM

## 2023-06-16 ENCOUNTER — HOSPITAL ENCOUNTER (OUTPATIENT)
Facility: HOSPITAL | Age: 38
Discharge: HOME | End: 2023-06-19
Payer: OTHER GOVERNMENT

## 2023-06-16 DIAGNOSIS — R05.1 ACUTE COUGH: ICD-10-CM

## 2023-06-16 PROCEDURE — 71046 X-RAY EXAM CHEST 2 VIEWS: CPT

## 2024-06-28 ENCOUNTER — HOSPITAL ENCOUNTER (OUTPATIENT)
Facility: HOSPITAL | Age: 39
Discharge: HOME | End: 2024-06-28
Attending: PODIATRIST
Payer: OTHER GOVERNMENT

## 2024-06-28 DIAGNOSIS — G57.61 LESION OF RIGHT PLANTAR NERVE: ICD-10-CM

## 2024-06-28 PROCEDURE — 73718 MRI LOWER EXTREMITY W/O DYE: CPT

## 2024-08-12 ENCOUNTER — HOSPITAL ENCOUNTER (OUTPATIENT)
Facility: HOSPITAL | Age: 39
Discharge: HOME | End: 2024-08-15
Attending: OTOLARYNGOLOGY
Payer: OTHER GOVERNMENT

## 2024-08-12 DIAGNOSIS — J32.0 CHRONIC MAXILLARY SINUSITIS: ICD-10-CM

## 2024-08-12 PROCEDURE — 70486 CT MAXILLOFACIAL W/O DYE: CPT

## (undated) DEVICE — GAUZE,SPONGE,4"X4",16PLY,STRL,LF,10/TRAY: Brand: MEDLINE

## (undated) DEVICE — SOLUTION IRRIG 1000ML H2O STRL BLT

## (undated) DEVICE — CANNULA NSL AD TBNG L14FT STD PVC O2 CRV CONN NONFLARED NSL

## (undated) DEVICE — FLUFF AND POLYMER UNDERPAD,EXTRA HEAVY: Brand: WINGS

## (undated) DEVICE — CATHETER SUCT TR FL TIP 14FR W/ O CTRL

## (undated) DEVICE — CANNULA ORIG TL CLR W FOAM CUSHIONS AND 14FT SUPL TB 3 CHN

## (undated) DEVICE — SYR 10ML LUER LOK 1/5ML GRAD --

## (undated) DEVICE — YANKAUER,SMOOTH HANDLE,HIGH CAPACITY: Brand: MEDLINE INDUSTRIES, INC.

## (undated) DEVICE — SYRINGE MED 50ML LUERSLIP TIP

## (undated) DEVICE — SYR 20ML LL STRL LF --

## (undated) DEVICE — SYRINGE MED 25GA 3ML L5/8IN SUBQ PLAS W/ DETACH NDL SFTY

## (undated) DEVICE — CATHETER THOR 36FR DIA10.7MM POLYVI CHL TRCR TIP STR SFT

## (undated) DEVICE — ENDOSCOPY PUMP TUBING/ CAP SET: Brand: ERBE

## (undated) DEVICE — GOWN ISOL IMPERV UNIV, DISP, OPEN BACK, BLUE --

## (undated) DEVICE — LINER SUCT CANSTR 3000CC PLAS SFT PRE ASSEMB W/OUT TBNG W/

## (undated) DEVICE — MEDI-VAC NON-CONDUCTIVE SUCTION TUBING: Brand: CARDINAL HEALTH